# Patient Record
Sex: MALE | Race: OTHER | Employment: FULL TIME | URBAN - METROPOLITAN AREA
[De-identification: names, ages, dates, MRNs, and addresses within clinical notes are randomized per-mention and may not be internally consistent; named-entity substitution may affect disease eponyms.]

---

## 2024-05-31 ENCOUNTER — HOSPITAL ENCOUNTER (EMERGENCY)
Age: 24
Discharge: HOME | End: 2024-05-31
Attending: EMERGENCY MEDICINE

## 2024-05-31 VITALS
OXYGEN SATURATION: 98 % | HEART RATE: 75 BPM | SYSTOLIC BLOOD PRESSURE: 111 MMHG | RESPIRATION RATE: 22 BRPM | DIASTOLIC BLOOD PRESSURE: 73 MMHG | TEMPERATURE: 97 F

## 2024-05-31 DIAGNOSIS — S05.01XA ABRASION OF RIGHT CORNEA, INITIAL ENCOUNTER: Primary | ICD-10-CM

## 2024-05-31 PROCEDURE — 6370000000 HC RX 637 (ALT 250 FOR IP): Performed by: STUDENT IN AN ORGANIZED HEALTH CARE EDUCATION/TRAINING PROGRAM

## 2024-05-31 PROCEDURE — 99283 EMERGENCY DEPT VISIT LOW MDM: CPT

## 2024-05-31 RX ORDER — POLYMYXIN B SULFATE AND TRIMETHOPRIM 1; 10000 MG/ML; [USP'U]/ML
1 SOLUTION OPHTHALMIC EVERY 4 HOURS
Qty: 3 ML | Refills: 0 | Status: SHIPPED | OUTPATIENT
Start: 2024-05-31 | End: 2024-06-10

## 2024-05-31 RX ORDER — POLYMYXIN B SULFATE AND TRIMETHOPRIM 1; 10000 MG/ML; [USP'U]/ML
1 SOLUTION OPHTHALMIC ONCE
Status: COMPLETED | OUTPATIENT
Start: 2024-05-31 | End: 2024-05-31

## 2024-05-31 RX ORDER — TETRACAINE HYDROCHLORIDE 5 MG/ML
1 SOLUTION OPHTHALMIC ONCE
Status: COMPLETED | OUTPATIENT
Start: 2024-05-31 | End: 2024-05-31

## 2024-05-31 RX ADMIN — POLYMYXIN B SULFATE AND TRIMETHOPRIM SULFATE 1 DROP: 10000; 1 SOLUTION/ DROPS OPHTHALMIC at 10:27

## 2024-05-31 RX ADMIN — FLUORESCEIN SODIUM 1 MG: 1 STRIP OPHTHALMIC at 10:05

## 2024-05-31 RX ADMIN — TETRACAINE HYDROCHLORIDE 1 DROP: 5 SOLUTION OPHTHALMIC at 10:05

## 2024-05-31 ASSESSMENT — VISUAL ACUITY
OD: 20/50
OS: 20/20

## 2024-05-31 ASSESSMENT — ENCOUNTER SYMPTOMS
EYE REDNESS: 1
EYE PAIN: 0

## 2024-05-31 ASSESSMENT — PAIN - FUNCTIONAL ASSESSMENT: PAIN_FUNCTIONAL_ASSESSMENT: NONE - DENIES PAIN

## 2024-05-31 NOTE — ED NOTES
Pt arrives alert and oriented x4 and ambulatory from triage   Pt complains of R eye redness and blurriness x3 days   Pt denies any drainage or crusted over discharge this morning  Pt states he's a  and works around a lot of dust and debris that gets into his eyes   Pt denies any problems with his L eye at this time   Pt is [place] speaking and  is at bedside at this time   RR even and unlabored.   NAD noted.   Whiteboard updated.  Will continue with plan of care.

## 2024-05-31 NOTE — ED PROVIDER NOTES
[facility] ED     Emergency Department     Faculty Attestation    I performed a history and physical examination of the patient and discussed management with the resident. I reviewed the resident’s note and agree with the documented findings and plan of care. Any areas of disagreement are noted on the chart. I was personally present for the key portions of any procedures. I have documented in the chart those procedures where I was not present during the key portions. I have reviewed the emergency nurses triage note. I agree with the chief complaint, past medical history, past surgical history, allergies, medications, social and family history as documented unless otherwise noted below. For Physician Assistant/ Nurse Practitioner cases/documentation I have personally evaluated this patient and have completed at least one if not all key elements of the E/M (history, physical exam, and MDM). Additional findings are as noted.    10:26 AM EDT    Patient is Thai-speaking so history was received using the  line.  Patient states that he has been having redness and blurred vision to his right eye for the past 4 days after he got dust in it while at work.  He says that he used some eyedrops to rinse it out immediately after the dust got in the eye but he is continue to have blurred vision since then.  He denies wearing glasses or contact lenses.  He has no other complaints.  On exam, patient is resting comfortably in the bed and appears well.  Extraocular muscles are intact and patient denies pain with eye movement.  There is no periorbital edema or erythema.  Examination of the right eye with fluorescein stain and Woods lamp shows a corneal abrasion about the 3 o'clock position.  Will treat with eyedrops and have a follow-up with ophthalmology if symptoms do not improve.      Namrata Orellana MD  Attending Emergency  Physician

## 2024-05-31 NOTE — DISCHARGE INSTRUCTIONS
Please use the drops every 3-4 hours while awake over the next 7-10 days.     If you develop worsening pain symptoms, visual changes or sign of infection such as increasing redness, drainage, please return to the emergency department immediately.

## 2024-05-31 NOTE — ED PROVIDER NOTES
history on file.    Allergies:  Patient has no known allergies.    Home Medications:  Prior to Admission medications    Medication Sig Start Date End Date Taking? Authorizing Provider   trimethoprim-polymyxin b (POLYTRIM) 96138-2.1 UNIT/ML-% ophthalmic solution Place 1 drop into the right eye every 4 hours for 10 days 5/31/24 6/10/24 Yes Dima Mcfarland MD     REVIEW OF SYSTEMS       Review of Systems   Constitutional:  Negative for fever.   Eyes:  Positive for redness and visual disturbance. Negative for pain.   Neurological:  Negative for headaches.     PHYSICAL EXAM      INITIAL VITALS:   /73   Pulse 75   Temp 97 °F (36.1 °C) (Oral)   Resp 22   SpO2 98%     Physical Exam  Constitutional:       General: He is not in acute distress.     Appearance: Normal appearance.   HENT:      Head: Normocephalic and atraumatic.   Eyes:      Extraocular Movements: Extraocular movements intact.      Pupils: Pupils are equal, round, and reactive to light.      Comments: Conjunctival erythema of the right eye.  No conjunctival erythema of the left.  Fluorescein examination performed which did show a small uptake at the 3 o'clock position just at the border of the pupil.  No pain with extraocular movements.  No periorbital edema.  No hemianopsia   Pulmonary:      Effort: Pulmonary effort is normal.   Neurological:      Mental Status: He is alert.       DDX/DIAGNOSTIC RESULTS / EMERGENCY DEPARTMENT COURSE / MDM     Medical Decision Making  23-year-old male present to the emergency department for evaluation of right eye redness as well as blurriness.  No eye pain.  Did have injury/foreign body 4 days ago however was able to flush this without difficulty.  Plan for visual acuity as well as fluorescein examination to look for corneal abrasion.  No hemianopsia present on examination.  Fluorescein exam did show a small amount of uptake overlying the left part of the right eye consistent with where patient vision.  Given  Polytrim and instructions for when to return to the emergency department.  Information eye doctor if symptoms persist despite antibiotic use.  Patient agreeable with this plan.    Risk  Prescription drug management.        EKG  none    All EKG's are interpreted by the Emergency Department Physician who either signs or Co-signs this chart in the absence of a cardiologist.    EMERGENCY DEPARTMENT COURSE:  See [facility]    ED Course as of 05/31/24 1110   Fri May 31, 2024   1025 Visual acuity:  R:20/50 L: 20/20 [CP]      ED Course User Index  [CP] Dima Mcfarland MD     PROCEDURES:  None    CONSULTS:  None    CRITICAL CARE:  There was significant risk of life threatening deterioration of patient's condition requiring my direct management. Critical care time 0 minutes, excluding any documented procedures.    FINAL IMPRESSION      1. Abrasion of right cornea, initial encounter        DISPOSITION / PLAN     DISPOSITION Decision To Discharge 05/31/2024 10:25:43 AM      PATIENT REFERRED TO:  Quinton Grewal MD  [address] [id number] [phone]    Schedule an appointment as soon as possible for a visit   re-evaluation    [facility] ED  [facility] [address]  [phone]    As needed, If symptoms worsen      DISCHARGE MEDICATIONS:  Discharge Medication List as of 5/31/2024 10:27 AM        START taking these medications    Details   trimethoprim-polymyxin b (POLYTRIM) 17832-1.1 UNIT/ML-% ophthalmic solution Place 1 drop into the right eye every 4 hours for 10 days, Disp-3 mL, R-0Print             Dima Mcfarland MD  Emergency Medicine Resident    (Please note that portions of thisnote were completed with a voice recognition program.  Efforts were made to edit the dictations but occasionally words are mis-transcribed.)

## 2024-06-03 ENCOUNTER — HOSPITAL ENCOUNTER (EMERGENCY)
Age: 24
Discharge: HOME | End: 2024-06-03
Attending: EMERGENCY MEDICINE

## 2024-06-03 VITALS
OXYGEN SATURATION: 100 % | TEMPERATURE: 97.2 F | SYSTOLIC BLOOD PRESSURE: 118 MMHG | RESPIRATION RATE: 18 BRPM | HEART RATE: 75 BPM | DIASTOLIC BLOOD PRESSURE: 65 MMHG

## 2024-06-03 DIAGNOSIS — H53.8 BLURRY VISION: Primary | ICD-10-CM

## 2024-06-03 PROCEDURE — 6370000000 HC RX 637 (ALT 250 FOR IP)

## 2024-06-03 PROCEDURE — 99283 EMERGENCY DEPT VISIT LOW MDM: CPT | Performed by: EMERGENCY MEDICINE

## 2024-06-03 RX ORDER — TETRACAINE HYDROCHLORIDE 5 MG/ML
1 SOLUTION OPHTHALMIC ONCE
Status: COMPLETED | OUTPATIENT
Start: 2024-06-03 | End: 2024-06-03

## 2024-06-03 RX ADMIN — TETRACAINE HYDROCHLORIDE 1 DROP: 5 SOLUTION OPHTHALMIC at 06:59

## 2024-06-03 RX ADMIN — FLUORESCEIN SODIUM 1 MG: 1 STRIP OPHTHALMIC at 06:57

## 2024-06-03 ASSESSMENT — ENCOUNTER SYMPTOMS
RESPIRATORY NEGATIVE: 1
EYE REDNESS: 1
ALLERGIC/IMMUNOLOGIC NEGATIVE: 1
GASTROINTESTINAL NEGATIVE: 1

## 2024-06-03 NOTE — DISCHARGE INSTRUCTIONS
You were seen evaluated for right eye blurriness.  Our exam showed you still have a right eye abrasion.  Please schedule an appointment with the ophthalmologist reference provided.  Please continue to use antibiotic eyedrops prescribed previously.    Lo evaluaron por visión borrosa del jenae derecho.  Nuestro examen mostró que todavía tienes arturo abrasión en el jenae derecho.  Por favor programe arturo demetria con la referencia oftalmológica proporcionada.  Continúe usando las gotas antibióticas recetadas anteriormente.

## 2024-06-03 NOTE — ED TRIAGE NOTES
Pt presents to the ED ambulatory through triage with c/o RT eye pain and blurry vision. Pt states he was seen on 5/31 for a corneal abrasion, prescribed eye drops, and has not had relief since. Pt A&OX4, RR even and unlabored. NAD. Call light within reach.  used.

## 2024-06-03 NOTE — ED PROVIDER NOTES
[facility]     Emergency Department     Faculty Attestation    I performed a history and physical examination of the patient and discussed management with the resident. I have reviewed and agree with the resident’s findings including all diagnostic interpretations, and treatment plans as written. Any areas of disagreement are noted on the chart. I was personally present for the key portions of any procedures. I have documented in the chart those procedures where I was not present during the key portions. I have reviewed the emergency nurses triage note. I agree with the chief complaint, past medical history, past surgical history, allergies, medications, social and family history as documented unless otherwise noted below. Documentation of the HPI, Physical Exam and Medical Decision Making performed by [email or web address] is based on my personal performance of the HPI, PE and MDM. For Physician Assistant/ Nurse Practitioner cases/documentation I have personally evaluated this patient and have completed at least one if not all key elements of the E/M (history, physical exam, and MDM). Additional findings are as noted.    Note Started: 6:37 AM EDT     22 yo M with persistent right eye pain, seen 5/31 for corneal abrasion given abx drop & advised to folow up,   PE vss gcs 15 VALERIANO, right upper right lower lid everted, no foreign body, no gross conjunctival injection, lids atraumatic, no periorbital swelling, consensual reflex intact  Va L 20/30, R 20/40, bilateral 20/30    -Ophthalmology referral /care turned over to day shift    EKG Interpretation    Interpreted by me      CRITICAL CARE: There was a high probability of clinically significant/life threatening deterioration in this patient's condition which required my urgent intervention.  Total critical care time was 5 minutes.  This excludes any time for separately reportable procedures.       Paco Liz DO    
Faculty Sign-Out Attestation  Handoff taken on the following patient from prior Attending Physician: Shalonda    Note Started: 7:07 AM EDT    I was available and discussed any additional care issues that arose and coordinated the management plans with the resident(s) caring for the patient during my duty period. Any areas of disagreement with resident’s documentation of care or procedures are noted on the chart. I was personally present for the key portions of any/all procedures during my duty period. I have documented in the chart those procedures where I was not present during the garcia portions.      Pepito Kramer MD  Attending Physician        Pepito Kramer MD  06/03/24 [id number]    
    1. Blurry vision          DISPOSITION / PLAN     DISPOSITION Decision To Discharge 06/03/2024 07:34:47 AM      PATIENT REFERRED TO:  Quinton Grewal MD  [address] [facility] [address] [phone]    Schedule an appointment as soon as possible for a visit today        DISCHARGE MEDICATIONS:  New Prescriptions    No medications on file       Martin Rodriguez MD  Emergency Medicine Resident    (Please note that portions of thisnote were completed with a voice recognition program.  Efforts were made to edit the dictations but occasionally words are mis-transcribed.)